# Patient Record
(demographics unavailable — no encounter records)

---

## 2024-11-05 NOTE — PHYSICAL EXAM
[2+] : left foot dorsalis pedis 2+ [Sensation] : the sensory exam was normal to light touch and pinprick [No Focal Deficits] : no focal deficits [Motor Exam] : the motor exam was normal [Deep Tendon Reflexes (DTR)] : deep tendon reflexes were 2+ and symmetric [Ankle Swelling (On Exam)] : not present [Varicose Veins Of Lower Extremities] : not present [] : not present [Delayed in the Right Toes] : capillary refills normal in right toes [Delayed in the Left Toes] : capillary refills normal in the left toes [FreeTextEntry3] : Hair growth noted on digits. Proximal to distal cooling is within normal limits.  [de-identified] : No pain with resisted plantar flexion. There is no Achilles tendonitis. There is pain at the insertion that is mild today. No pain over the calf. She has equinus and very tight posterior muscle group. [FreeTextEntry1] : Minor swelling at the insertion of the Achilles.  [Diminished Throughout Right Foot] : normal sensation with monofilament testing throughout right foot [Diminished Throughout Left Foot] : normal sensation with monofilament testing throughout left foot

## 2024-11-05 NOTE — HISTORY OF PRESENT ILLNESS
[FreeTextEntry1] : Patient presents today with her family for Jigar's bump at the left heel. It has been bothering him for about a week. She denies any trauma. It has been really annoying and was quite sensitive recently on the left side. She did not overuse or injure it.

## 2024-11-05 NOTE — PHYSICAL EXAM
[2+] : left foot dorsalis pedis 2+ [Sensation] : the sensory exam was normal to light touch and pinprick [No Focal Deficits] : no focal deficits [Motor Exam] : the motor exam was normal [Deep Tendon Reflexes (DTR)] : deep tendon reflexes were 2+ and symmetric [Ankle Swelling (On Exam)] : not present [Varicose Veins Of Lower Extremities] : not present [] : not present [Delayed in the Right Toes] : capillary refills normal in right toes [Delayed in the Left Toes] : capillary refills normal in the left toes [FreeTextEntry3] : Hair growth noted on digits. Proximal to distal cooling is within normal limits.  [de-identified] : No pain with resisted plantar flexion. There is no Achilles tendonitis. There is pain at the insertion that is mild today. No pain over the calf. She has equinus and very tight posterior muscle group. [FreeTextEntry1] : Minor swelling at the insertion of the Achilles.  [Diminished Throughout Right Foot] : normal sensation with monofilament testing throughout right foot [Diminished Throughout Left Foot] : normal sensation with monofilament testing throughout left foot

## 2024-11-05 NOTE — PROCEDURE
[FreeTextEntry1] : X-rays taken to evaluate for spur or bony irregularity. X-ray Report: (Left foot - 2 views) I got x-rays that demonstrates a Jigar's bump, but no obvious spur or other irregularity appreciated.

## 2024-11-05 NOTE — ASSESSMENT
[FreeTextEntry1] : Impression: Enthesopathy or Achilles tendonitis. Pain left foot.  Treatment: I gave her multiple exercises in terms of stretching to work on.  I put her on Meloxicam once a day with food only. She is going to ice and stretch. With continued pain that persists she is going to do physical therapy. I gave her a prescription for physical therapy. She will follow-up with continued pain that persists.

## 2024-11-05 NOTE — PHYSICAL EXAM
[2+] : left foot dorsalis pedis 2+ [Sensation] : the sensory exam was normal to light touch and pinprick [No Focal Deficits] : no focal deficits [Motor Exam] : the motor exam was normal [Deep Tendon Reflexes (DTR)] : deep tendon reflexes were 2+ and symmetric [Ankle Swelling (On Exam)] : not present [Varicose Veins Of Lower Extremities] : not present [] : not present [Delayed in the Right Toes] : capillary refills normal in right toes [Delayed in the Left Toes] : capillary refills normal in the left toes [FreeTextEntry3] : Hair growth noted on digits. Proximal to distal cooling is within normal limits.  [de-identified] : No pain with resisted plantar flexion. There is no Achilles tendonitis. There is pain at the insertion that is mild today. No pain over the calf. She has equinus and very tight posterior muscle group. [FreeTextEntry1] : Minor swelling at the insertion of the Achilles.  [Diminished Throughout Right Foot] : normal sensation with monofilament testing throughout right foot [Diminished Throughout Left Foot] : normal sensation with monofilament testing throughout left foot